# Patient Record
Sex: FEMALE | URBAN - METROPOLITAN AREA
[De-identification: names, ages, dates, MRNs, and addresses within clinical notes are randomized per-mention and may not be internally consistent; named-entity substitution may affect disease eponyms.]

---

## 2022-09-26 ENCOUNTER — APPOINTMENT (RX ONLY)
Dept: URBAN - METROPOLITAN AREA CLINIC 43 | Facility: CLINIC | Age: 62
Setting detail: DERMATOLOGY
End: 2022-09-26

## 2022-09-26 DIAGNOSIS — B35.1 TINEA UNGUIUM: ICD-10-CM

## 2022-09-26 PROCEDURE — 99203 OFFICE O/P NEW LOW 30 MIN: CPT

## 2022-09-26 PROCEDURE — ? TREATMENT REGIMEN

## 2022-09-26 PROCEDURE — ? ORDER TESTS

## 2022-09-26 ASSESSMENT — LOCATION SIMPLE DESCRIPTION DERM
LOCATION SIMPLE: RIGHT THUMBNAIL
LOCATION SIMPLE: LEFT THUMBNAIL

## 2022-09-26 ASSESSMENT — LOCATION DETAILED DESCRIPTION DERM
LOCATION DETAILED: RIGHT THUMBNAIL
LOCATION DETAILED: LEFT THUMBNAIL

## 2022-09-26 ASSESSMENT — LOCATION ZONE DERM: LOCATION ZONE: FINGERNAIL

## 2022-09-26 NOTE — PROCEDURE: ORDER TESTS
Performing Laboratory: 0
Billing Type: Third-Party Bill
Bill For Surgical Tray: no
Expected Date Of Service: 09/26/2022

## 2022-09-26 NOTE — PROCEDURE: TREATMENT REGIMEN
Detail Level: Zone
Plan: Onychomycosis- R and L thumb nails \\n- Nail clipping positive for fungus per patient (ordered by primary care physician)\\n- Discussed treatment options including topical (Skin medicinals anti-fungal solution) vs oral medications (Fluconazole, Terbinafine) \\n- Patient prefers to trial oral therapy. Due to drug interactions with Fluconazole and patient’s other current medications, will  plan to start on course of Lamisil. Explained risks of side effects and lab monitoring guidelines. \\n- Provided lab order for CBC and CMP. Once labs are reviewed, will send in Rx for Lamisil (Terbinafine 250mg daily). \\n\\nRecheck in 6-8 weeks, will repeat labs.

## 2022-10-10 ENCOUNTER — RX ONLY (OUTPATIENT)
Age: 62
Setting detail: RX ONLY
End: 2022-10-10

## 2022-10-10 RX ORDER — TERBINAFINE HYDROCHLORIDE 250 MG/1
TABLET ORAL
Qty: 90 | Refills: 0 | Status: ERX | COMMUNITY
Start: 2022-10-10

## 2022-11-21 ENCOUNTER — APPOINTMENT (RX ONLY)
Dept: URBAN - METROPOLITAN AREA CLINIC 43 | Facility: CLINIC | Age: 62
Setting detail: DERMATOLOGY
End: 2022-11-21

## 2022-11-21 DIAGNOSIS — B35.1 TINEA UNGUIUM: ICD-10-CM

## 2022-11-21 PROCEDURE — 99213 OFFICE O/P EST LOW 20 MIN: CPT

## 2022-11-21 PROCEDURE — ? PRESCRIPTION

## 2022-11-21 PROCEDURE — ? TREATMENT REGIMEN

## 2022-11-21 RX ORDER — CICLOPIROX 80 MG/ML
SOLUTION TOPICAL
Qty: 6.6 | Refills: 6 | Status: ERX | COMMUNITY
Start: 2022-11-21

## 2022-11-21 RX ADMIN — CICLOPIROX: 80 SOLUTION TOPICAL at 00:00

## 2022-11-21 ASSESSMENT — LOCATION ZONE DERM: LOCATION ZONE: FINGERNAIL

## 2022-11-21 ASSESSMENT — LOCATION DETAILED DESCRIPTION DERM
LOCATION DETAILED: LEFT THUMBNAIL
LOCATION DETAILED: RIGHT THUMBNAIL

## 2022-11-21 ASSESSMENT — LOCATION SIMPLE DESCRIPTION DERM
LOCATION SIMPLE: RIGHT THUMBNAIL
LOCATION SIMPLE: LEFT THUMBNAIL

## 2022-11-21 NOTE — PROCEDURE: TREATMENT REGIMEN
Detail Level: Zone
Plan: Onychomycosis- R and L thumb nails. Nail clipping positive for fungus per patient (ordered by primary care physician)\\n\\n- Improvement on oral Terbinafine but patient was experiencing extreme fatigue, body aches and dizziness. She had a work up with her primary care physician. Symptoms improved after she stopped Terbinafine one week ago. \\n- Will switch to topical treatment with Ciclopirox topical solution. Apply to affected nails once daily at night. \\n- Recommend trial of no nail polish as well. \\n- Notify clinic if worsening or no improvement. \\n\\nRecheck in 3 months

## 2023-02-21 ENCOUNTER — APPOINTMENT (RX ONLY)
Dept: URBAN - METROPOLITAN AREA CLINIC 43 | Facility: CLINIC | Age: 63
Setting detail: DERMATOLOGY
End: 2023-02-21

## 2023-02-21 DIAGNOSIS — B35.1 TINEA UNGUIUM: ICD-10-CM

## 2023-02-21 PROCEDURE — ? TREATMENT REGIMEN

## 2023-02-21 PROCEDURE — 99213 OFFICE O/P EST LOW 20 MIN: CPT

## 2023-02-21 ASSESSMENT — LOCATION ZONE DERM: LOCATION ZONE: FINGERNAIL

## 2023-02-21 ASSESSMENT — LOCATION SIMPLE DESCRIPTION DERM
LOCATION SIMPLE: RIGHT THUMBNAIL
LOCATION SIMPLE: LEFT THUMBNAIL

## 2023-02-21 ASSESSMENT — LOCATION DETAILED DESCRIPTION DERM
LOCATION DETAILED: RIGHT THUMBNAIL
LOCATION DETAILED: LEFT THUMBNAIL

## 2023-02-21 NOTE — PROCEDURE: TREATMENT REGIMEN
Detail Level: Zone
Plan: Onychomycosis- R and L thumb nails. Nail clipping positive for fungus per patient (ordered by primary care physician).\\nImprovement on oral Terbinafine but patient was experiencing extreme fatigue, body aches and dizziness. She had a work up with her primary care physician. Symptoms improved after she stopped Terbinafine. \\n\\nSince switching to Ciclopirox topical solution, she has noticed continued improvement. Some erythema and irritation around the nail. \\n\\n- Apply to affected nails once daily at night. \\n- provided sample of TAC 0.1% ointment twice daily x 1-2 weeks. Avoid picking at skin around nails. \\n\\n- Notify clinic if worsening or no improvement. \\n\\nRecheck in 4-6 months

## 2023-06-20 ENCOUNTER — APPOINTMENT (RX ONLY)
Dept: URBAN - METROPOLITAN AREA CLINIC 43 | Facility: CLINIC | Age: 63
Setting detail: DERMATOLOGY
End: 2023-06-20

## 2023-06-20 DIAGNOSIS — B35.1 TINEA UNGUIUM: ICD-10-CM

## 2023-06-20 PROCEDURE — ? TREATMENT REGIMEN

## 2023-06-20 PROCEDURE — 99213 OFFICE O/P EST LOW 20 MIN: CPT

## 2023-06-20 ASSESSMENT — LOCATION DETAILED DESCRIPTION DERM
LOCATION DETAILED: LEFT THUMBNAIL
LOCATION DETAILED: RIGHT THUMBNAIL

## 2023-06-20 ASSESSMENT — LOCATION ZONE DERM: LOCATION ZONE: FINGERNAIL

## 2023-06-20 ASSESSMENT — LOCATION SIMPLE DESCRIPTION DERM
LOCATION SIMPLE: RIGHT THUMBNAIL
LOCATION SIMPLE: LEFT THUMBNAIL

## 2023-06-20 NOTE — PROCEDURE: TREATMENT REGIMEN
Detail Level: Zone
Plan: Onychomycosis- R and L thumb nails. Nail clipping positive for fungus per patient (ordered by primary care physician).\\nImprovement on oral Terbinafine but patient was experiencing extreme fatigue, body aches and dizziness. She had a work up with her primary care physician. Symptoms improved after she stopped Terbinafine. \\n\\n-Almost clear! markedly improved. \\n- continue ciclopirox 2-3x a week. \\n- Ok to keep nails moisturized with vaseline. \\n\\nNotify clinic if worsening or no improvement. \\n\\nRecheck in 6 months